# Patient Record
(demographics unavailable — no encounter records)

---

## 2024-11-19 NOTE — PHYSICAL EXAM
[Normal] : affect was normal and insight and judgment were intact [Normal Outer Ear/Nose] : the outer ears and nose were normal in appearance [Normal TMs] : both tympanic membranes were normal [Normal Nasal Mucosa] : the nasal mucosa was normal [de-identified] : edematous tonsils b/l [de-identified] : tender anterior cervical node on left

## 2024-11-19 NOTE — HISTORY OF PRESENT ILLNESS
[de-identified] : Patient is a 25 year old M with a PMHx of Diverticulitis coming in for follow up.  Patient reports sore throat for past two days with no cough or fever.  Patient also for preemployment exam.  Patient denies chest pain, SOB, nausea, vomiting, or palpitations at this time.